# Patient Record
Sex: FEMALE | Race: WHITE
[De-identification: names, ages, dates, MRNs, and addresses within clinical notes are randomized per-mention and may not be internally consistent; named-entity substitution may affect disease eponyms.]

---

## 2018-07-25 ENCOUNTER — HOSPITAL ENCOUNTER (INPATIENT)
Dept: HOSPITAL 96 - M.ERS | Age: 68
LOS: 5 days | Discharge: NURSING, REHAB OR LONG-TERM CARE | DRG: 493 | End: 2018-07-30
Attending: INTERNAL MEDICINE | Admitting: INTERNAL MEDICINE
Payer: COMMERCIAL

## 2018-07-25 VITALS — SYSTOLIC BLOOD PRESSURE: 146 MMHG | DIASTOLIC BLOOD PRESSURE: 68 MMHG

## 2018-07-25 VITALS — BODY MASS INDEX: 17.74 KG/M2 | HEIGHT: 67.01 IN | WEIGHT: 113 LBS

## 2018-07-25 VITALS — SYSTOLIC BLOOD PRESSURE: 137 MMHG | DIASTOLIC BLOOD PRESSURE: 66 MMHG

## 2018-07-25 DIAGNOSIS — Z79.2: ICD-10-CM

## 2018-07-25 DIAGNOSIS — Z79.82: ICD-10-CM

## 2018-07-25 DIAGNOSIS — E87.1: ICD-10-CM

## 2018-07-25 DIAGNOSIS — E87.6: ICD-10-CM
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
## 2018-07-25 DIAGNOSIS — Z79.899: ICD-10-CM
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
## 2018-07-25 DIAGNOSIS — G62.9: ICD-10-CM

## 2018-07-25 DIAGNOSIS — I10: ICD-10-CM

## 2018-07-25 DIAGNOSIS — Z87.891: ICD-10-CM

## 2018-07-25 DIAGNOSIS — F10.10: ICD-10-CM

## 2018-07-25 DIAGNOSIS — E83.42: ICD-10-CM

## 2018-07-25 DIAGNOSIS — Z88.8: ICD-10-CM

## 2018-07-25 DIAGNOSIS — Z88.2: ICD-10-CM
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
## 2018-07-25 DIAGNOSIS — E78.00: ICD-10-CM
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
## 2018-07-25 DIAGNOSIS — S82.852A: Primary | ICD-10-CM

## 2018-07-25 LAB
ABSOLUTE MONOCYTES: 0.7 THOU/UL (ref 0–1.2)
ALBUMIN SERPL-MCNC: 3.9 G/DL (ref 3.4–5)
ALP SERPL-CCNC: 77 U/L (ref 46–116)
ALT SERPL-CCNC: 30 U/L (ref 30–65)
ANION GAP SERPL CALC-SCNC: 8 MMOL/L (ref 7–16)
APTT BLD: 28.9 SECONDS (ref 25–31.3)
AST SERPL-CCNC: 39 U/L (ref 15–37)
BILIRUB SERPL-MCNC: 1 MG/DL
BUN SERPL-MCNC: 4 MG/DL (ref 7–18)
CALCIUM SERPL-MCNC: 8.8 MG/DL (ref 8.5–10.1)
CHLORIDE SERPL-SCNC: 86 MMOL/L (ref 98–107)
CO2 SERPL-SCNC: 26 MMOL/L (ref 21–32)
CREAT SERPL-MCNC: 0.5 MG/DL (ref 0.6–1.3)
GLUCOSE SERPL-MCNC: 97 MG/DL (ref 70–99)
GRANULOCYTES NFR BLD MANUAL: 85 %
HCT VFR BLD CALC: 35.8 % (ref 37–47)
HGB BLD-MCNC: 12.6 GM/DL (ref 12–15)
INR PPP: 1.1
LYMPHOCYTES # BLD: 0.8 THOU/UL (ref 0.8–5.3)
LYMPHOCYTES NFR BLD AUTO: 8 %
MCH RBC QN AUTO: 34.1 PG (ref 26–34)
MCHC RBC AUTO-ENTMCNC: 35.1 G/DL (ref 28–37)
MCV RBC: 97.3 FL (ref 80–100)
MONOCYTES NFR BLD: 7 %
MPV: 6.3 FL. (ref 7.2–11.1)
NEUTROPHILS # BLD: 9 THOU/UL (ref 1.6–8.1)
NUCLEATED RBCS: 0 /100WBC
PLATELET # BLD EST: ADEQUATE 10*3/UL
PLATELET COUNT*: 163 THOU/UL (ref 150–400)
POTASSIUM SERPL-SCNC: 4.7 MMOL/L (ref 3.5–5.1)
PROT SERPL-MCNC: 7.2 G/DL (ref 6.4–8.2)
PROTHROMBIN TIME: 10.7 SECONDS (ref 9.2–11.5)
RBC # BLD AUTO: 3.68 MIL/UL (ref 4.2–5)
RDW-CV: 13.4 % (ref 10.5–14.5)
SODIUM SERPL-SCNC: 120 MMOL/L (ref 136–145)
WBC # BLD AUTO: 10.6 THOU/UL (ref 4–11)

## 2018-07-25 PROCEDURE — 0SSGXZZ REPOSITION LEFT ANKLE JOINT, EXTERNAL APPROACH: ICD-10-PCS | Performed by: NURSE PRACTITIONER

## 2018-07-25 PROCEDURE — 2W3MX1Z IMMOBILIZATION OF LEFT LOWER EXTREMITY USING SPLINT: ICD-10-PCS | Performed by: NURSE PRACTITIONER

## 2018-07-26 VITALS — SYSTOLIC BLOOD PRESSURE: 155 MMHG | DIASTOLIC BLOOD PRESSURE: 73 MMHG

## 2018-07-26 VITALS — DIASTOLIC BLOOD PRESSURE: 66 MMHG | SYSTOLIC BLOOD PRESSURE: 149 MMHG

## 2018-07-26 VITALS — DIASTOLIC BLOOD PRESSURE: 78 MMHG | SYSTOLIC BLOOD PRESSURE: 156 MMHG

## 2018-07-26 PROCEDURE — 0QSH35Z REPOSITION LEFT TIBIA WITH EXTERNAL FIXATION DEVICE, PERCUTANEOUS APPROACH: ICD-10-PCS | Performed by: ORTHOPAEDIC SURGERY

## 2018-07-26 NOTE — EKG
[place] [address]
Phone:  [phone]                     ELECTROCARDIOGRAM REPORT      
_______________________________________________________________________________
 
Name:       BUBBA MELCHOR           Room:           [address]    ADM IN  
[facility]#:  [id number]      Account #:      [id number]  
Admission:  18     Attend Phys:    Eliud Fraser MD 
Discharge:               Date of Birth:  [birth year]  
         Report #: [id number]
    [id number]
_______________________________________________________________________________
THIS REPORT FOR:  //name//                      
 
                         [facility] ED
                                       
Test Date:    2018               Test Time:    21:58:21
Pat Name:     BUBBA MELCHOR             Department:   
Patient ID:   [id number]            Room:         [place] [address]
Gender:       F                        Technician:   WA
:          1950               Requested By: Omaira Ray
Order Number: [id number] MD:   Fabiano Tian
                                 Measurements
Intervals                              Axis          
Rate:         72                       P:            60
ND:           196                      QRS:          -17
QRSD:         90                       T:            14
QT:           444                                    
QTc:          486                                    
                           Interpretive Statements
Sinus rhythm
Borderline left axis deviation
Borderline prolonged QT interval
Compared to ECG 2013 19:31:01
No significant changes
 
Electronically Signed On 2018 15:19:54 CDT by Fabiano Tian
[email or web address]
 
 
 
 
 
 
 
 
 
 
 
 
 
 
 
 
 
  <ELECTRONICALLY SIGNED>
                                           By: Fabiano Tian MD, [facility]     
  18     1519
D: 18 2158   _____________________________________
T: 18 2158   Fabiano Tian MD, [facility]       /EPI

## 2018-07-27 VITALS — SYSTOLIC BLOOD PRESSURE: 117 MMHG | DIASTOLIC BLOOD PRESSURE: 61 MMHG

## 2018-07-27 VITALS — SYSTOLIC BLOOD PRESSURE: 151 MMHG | DIASTOLIC BLOOD PRESSURE: 67 MMHG

## 2018-07-27 VITALS — SYSTOLIC BLOOD PRESSURE: 164 MMHG | DIASTOLIC BLOOD PRESSURE: 88 MMHG

## 2018-07-27 VITALS — DIASTOLIC BLOOD PRESSURE: 67 MMHG | SYSTOLIC BLOOD PRESSURE: 132 MMHG

## 2018-07-27 VITALS — DIASTOLIC BLOOD PRESSURE: 72 MMHG | SYSTOLIC BLOOD PRESSURE: 138 MMHG

## 2018-07-28 VITALS — DIASTOLIC BLOOD PRESSURE: 61 MMHG | SYSTOLIC BLOOD PRESSURE: 133 MMHG

## 2018-07-28 VITALS — DIASTOLIC BLOOD PRESSURE: 62 MMHG | SYSTOLIC BLOOD PRESSURE: 153 MMHG

## 2018-07-28 VITALS — SYSTOLIC BLOOD PRESSURE: 172 MMHG | DIASTOLIC BLOOD PRESSURE: 78 MMHG

## 2018-07-28 VITALS — DIASTOLIC BLOOD PRESSURE: 89 MMHG | SYSTOLIC BLOOD PRESSURE: 160 MMHG

## 2018-07-28 VITALS — SYSTOLIC BLOOD PRESSURE: 140 MMHG | DIASTOLIC BLOOD PRESSURE: 56 MMHG

## 2018-07-28 VITALS — SYSTOLIC BLOOD PRESSURE: 148 MMHG | DIASTOLIC BLOOD PRESSURE: 86 MMHG

## 2018-07-28 LAB
ANION GAP SERPL CALC-SCNC: 5 MMOL/L (ref 7–16)
BUN SERPL-MCNC: 2 MG/DL (ref 7–18)
CALCIUM SERPL-MCNC: 8.6 MG/DL (ref 8.5–10.1)
CHLORIDE SERPL-SCNC: 99 MMOL/L (ref 98–107)
CO2 SERPL-SCNC: 29 MMOL/L (ref 21–32)
CREAT SERPL-MCNC: 0.5 MG/DL (ref 0.6–1.3)
GLUCOSE SERPL-MCNC: 137 MG/DL (ref 70–99)
POTASSIUM SERPL-SCNC: 3.5 MMOL/L (ref 3.5–5.1)
SODIUM SERPL-SCNC: 133 MMOL/L (ref 136–145)

## 2018-07-29 VITALS — DIASTOLIC BLOOD PRESSURE: 71 MMHG | SYSTOLIC BLOOD PRESSURE: 143 MMHG

## 2018-07-29 VITALS — SYSTOLIC BLOOD PRESSURE: 161 MMHG | DIASTOLIC BLOOD PRESSURE: 77 MMHG

## 2018-07-29 VITALS — SYSTOLIC BLOOD PRESSURE: 163 MMHG | DIASTOLIC BLOOD PRESSURE: 69 MMHG

## 2018-07-29 VITALS — DIASTOLIC BLOOD PRESSURE: 74 MMHG | SYSTOLIC BLOOD PRESSURE: 144 MMHG

## 2018-07-29 VITALS — SYSTOLIC BLOOD PRESSURE: 149 MMHG | DIASTOLIC BLOOD PRESSURE: 81 MMHG

## 2018-07-29 LAB
ANION GAP SERPL CALC-SCNC: 5 MMOL/L (ref 7–16)
BUN SERPL-MCNC: 2 MG/DL (ref 7–18)
CALCIUM SERPL-MCNC: 8.5 MG/DL (ref 8.5–10.1)
CHLORIDE SERPL-SCNC: 97 MMOL/L (ref 98–107)
CO2 SERPL-SCNC: 28 MMOL/L (ref 21–32)
CREAT SERPL-MCNC: 0.4 MG/DL (ref 0.6–1.3)
GLUCOSE SERPL-MCNC: 107 MG/DL (ref 70–99)
HCT VFR BLD CALC: 27.5 % (ref 37–47)
HGB BLD-MCNC: 9.5 GM/DL (ref 12–15)
MAGNESIUM SERPL-MCNC: 1.3 MG/DL (ref 1.8–2.4)
MCH RBC QN AUTO: 34.7 PG (ref 26–34)
MCHC RBC AUTO-ENTMCNC: 34.6 G/DL (ref 28–37)
MCV RBC: 100.4 FL (ref 80–100)
MPV: 7.1 FL. (ref 7.2–11.1)
PLATELET COUNT*: 113 THOU/UL (ref 150–400)
POTASSIUM SERPL-SCNC: 3.8 MMOL/L (ref 3.5–5.1)
RBC # BLD AUTO: 2.74 MIL/UL (ref 4.2–5)
RDW-CV: 13.3 % (ref 10.5–14.5)
SODIUM SERPL-SCNC: 130 MMOL/L (ref 136–145)
WBC # BLD AUTO: 3.3 THOU/UL (ref 4–11)

## 2018-07-30 VITALS — SYSTOLIC BLOOD PRESSURE: 148 MMHG | DIASTOLIC BLOOD PRESSURE: 75 MMHG

## 2018-07-30 VITALS — DIASTOLIC BLOOD PRESSURE: 66 MMHG | SYSTOLIC BLOOD PRESSURE: 147 MMHG

## 2018-07-30 VITALS — DIASTOLIC BLOOD PRESSURE: 81 MMHG | SYSTOLIC BLOOD PRESSURE: 147 MMHG

## 2018-07-30 VITALS — SYSTOLIC BLOOD PRESSURE: 163 MMHG | DIASTOLIC BLOOD PRESSURE: 77 MMHG

## 2018-07-30 VITALS — DIASTOLIC BLOOD PRESSURE: 75 MMHG | SYSTOLIC BLOOD PRESSURE: 148 MMHG

## 2018-07-30 LAB
ANION GAP SERPL CALC-SCNC: 6 MMOL/L (ref 7–16)
BUN SERPL-MCNC: 4 MG/DL (ref 7–18)
CALCIUM SERPL-MCNC: 8.6 MG/DL (ref 8.5–10.1)
CHLORIDE SERPL-SCNC: 98 MMOL/L (ref 98–107)
CO2 SERPL-SCNC: 27 MMOL/L (ref 21–32)
CREAT SERPL-MCNC: 0.5 MG/DL (ref 0.6–1.3)
GLUCOSE SERPL-MCNC: 102 MG/DL (ref 70–99)
HCT VFR BLD CALC: 28.9 % (ref 37–47)
HGB BLD-MCNC: 10 GM/DL (ref 12–15)
MAGNESIUM SERPL-MCNC: 1.8 MG/DL (ref 1.8–2.4)
MCH RBC QN AUTO: 34.3 PG (ref 26–34)
MCHC RBC AUTO-ENTMCNC: 34.7 G/DL (ref 28–37)
MCV RBC: 98.8 FL (ref 80–100)
MPV: 6.8 FL. (ref 7.2–11.1)
PLATELET COUNT*: 141 THOU/UL (ref 150–400)
POTASSIUM SERPL-SCNC: 3.6 MMOL/L (ref 3.5–5.1)
RBC # BLD AUTO: 2.93 MIL/UL (ref 4.2–5)
RDW-CV: 13.4 % (ref 10.5–14.5)
SODIUM SERPL-SCNC: 131 MMOL/L (ref 136–145)
WBC # BLD AUTO: 3.8 THOU/UL (ref 4–11)

## 2018-08-14 ENCOUNTER — HOSPITAL ENCOUNTER (OUTPATIENT)
Dept: HOSPITAL 96 - M.PRE | Age: 68
Discharge: STILL ADMITTED OR ADMINISTRATIVE | End: 2018-08-14
Attending: INTERNAL MEDICINE
Payer: COMMERCIAL

## 2018-08-14 VITALS — DIASTOLIC BLOOD PRESSURE: 71 MMHG | SYSTOLIC BLOOD PRESSURE: 131 MMHG

## 2018-08-14 VITALS — WEIGHT: 110 LBS | BODY MASS INDEX: 18.33 KG/M2 | HEIGHT: 65 IN

## 2018-08-14 DIAGNOSIS — S82.62XA: Primary | ICD-10-CM
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
## 2018-08-14 LAB
ALBUMIN SERPL-MCNC: 3.4 G/DL (ref 3.4–5)
ALP SERPL-CCNC: 96 U/L (ref 46–116)
ALT SERPL-CCNC: 20 U/L (ref 30–65)
ANION GAP SERPL CALC-SCNC: 4 MMOL/L (ref 7–16)
AST SERPL-CCNC: 19 U/L (ref 15–37)
BILIRUB SERPL-MCNC: 0.9 MG/DL
BUN SERPL-MCNC: 6 MG/DL (ref 7–18)
CALCIUM SERPL-MCNC: 9.3 MG/DL (ref 8.5–10.1)
CHLORIDE SERPL-SCNC: 98 MMOL/L (ref 98–107)
CO2 SERPL-SCNC: 30 MMOL/L (ref 21–32)
CREAT SERPL-MCNC: 0.6 MG/DL (ref 0.6–1.3)
GLUCOSE SERPL-MCNC: 101 MG/DL (ref 70–99)
POTASSIUM SERPL-SCNC: 4.2 MMOL/L (ref 3.5–5.1)
PROT SERPL-MCNC: 7.6 G/DL (ref 6.4–8.2)
SODIUM SERPL-SCNC: 132 MMOL/L (ref 136–145)

## 2018-08-22 ENCOUNTER — HOSPITAL ENCOUNTER (INPATIENT)
Dept: HOSPITAL 96 - M.PRE | Age: 68
LOS: 2 days | Discharge: HOME | DRG: 504 | End: 2018-08-24
Attending: INTERNAL MEDICINE | Admitting: INTERNAL MEDICINE
Payer: COMMERCIAL

## 2018-08-22 VITALS — HEIGHT: 65 IN | BODY MASS INDEX: 18.33 KG/M2 | WEIGHT: 110 LBS

## 2018-08-22 VITALS — SYSTOLIC BLOOD PRESSURE: 186 MMHG | DIASTOLIC BLOOD PRESSURE: 82 MMHG

## 2018-08-22 VITALS — SYSTOLIC BLOOD PRESSURE: 130 MMHG | DIASTOLIC BLOOD PRESSURE: 60 MMHG
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
## 2018-08-22 DIAGNOSIS — Z79.899: ICD-10-CM

## 2018-08-22 DIAGNOSIS — G62.9: ICD-10-CM
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
## 2018-08-22 DIAGNOSIS — M86.8X7: ICD-10-CM

## 2018-08-22 DIAGNOSIS — S82.842A: Primary | ICD-10-CM

## 2018-08-22 DIAGNOSIS — I10: ICD-10-CM

## 2018-08-22 DIAGNOSIS — Z88.8: ICD-10-CM
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
## 2018-08-22 DIAGNOSIS — E78.00: ICD-10-CM

## 2018-08-22 DIAGNOSIS — E44.0: ICD-10-CM

## 2018-08-22 DIAGNOSIS — Z87.891: ICD-10-CM

## 2018-08-22 DIAGNOSIS — Z88.2: ICD-10-CM

## 2018-08-22 NOTE — NUR
PATIENT ARRIVED BY BED FROM PACU TO ROOM 113 IN STABLE CONDITION AT 1955.
ALERT AND ORIENTED X4. DENIES PAIN OR NAUSEA. DAUGHTER AT BEDSIDE. WILL
CONTINUE TO MONITOR.

## 2018-08-23 VITALS — SYSTOLIC BLOOD PRESSURE: 120 MMHG | DIASTOLIC BLOOD PRESSURE: 60 MMHG

## 2018-08-23 VITALS — DIASTOLIC BLOOD PRESSURE: 68 MMHG | SYSTOLIC BLOOD PRESSURE: 137 MMHG

## 2018-08-23 VITALS — DIASTOLIC BLOOD PRESSURE: 68 MMHG | SYSTOLIC BLOOD PRESSURE: 110 MMHG

## 2018-08-23 VITALS — DIASTOLIC BLOOD PRESSURE: 65 MMHG | SYSTOLIC BLOOD PRESSURE: 125 MMHG

## 2018-08-23 VITALS — DIASTOLIC BLOOD PRESSURE: 56 MMHG | SYSTOLIC BLOOD PRESSURE: 102 MMHG

## 2018-08-23 LAB
ALBUMIN SERPL-MCNC: 2.3 G/DL (ref 3.4–5)
ALP SERPL-CCNC: 77 U/L (ref 46–116)
ALT SERPL-CCNC: 9 U/L (ref 30–65)
ANION GAP SERPL CALC-SCNC: 4 MMOL/L (ref 7–16)
AST SERPL-CCNC: 11 U/L (ref 15–37)
BILIRUB SERPL-MCNC: 0.6 MG/DL
BUN SERPL-MCNC: 8 MG/DL (ref 7–18)
CALCIUM SERPL-MCNC: 7.4 MG/DL (ref 8.5–10.1)
CHLORIDE SERPL-SCNC: 98 MMOL/L (ref 98–107)
CO2 SERPL-SCNC: 28 MMOL/L (ref 21–32)
CREAT SERPL-MCNC: 0.7 MG/DL (ref 0.6–1.3)
ESR (SEDRATE): 74 MM/HR (ref 0–30)
GLUCOSE SERPL-MCNC: 113 MG/DL (ref 70–99)
HCT VFR BLD CALC: 28.5 % (ref 37–47)
HGB BLD-MCNC: 9.7 GM/DL (ref 12–15)
MCH RBC QN AUTO: 32.6 PG (ref 26–34)
MCHC RBC AUTO-ENTMCNC: 34 G/DL (ref 28–37)
MCV RBC: 95.8 FL (ref 80–100)
MPV: 7.7 FL. (ref 7.2–11.1)
PLATELET COUNT*: 178 THOU/UL (ref 150–400)
POTASSIUM SERPL-SCNC: 4.3 MMOL/L (ref 3.5–5.1)
PROT SERPL-MCNC: 5.1 G/DL (ref 6.4–8.2)
RBC # BLD AUTO: 2.97 MIL/UL (ref 4.2–5)
RDW-CV: 12.8 % (ref 10.5–14.5)
SODIUM SERPL-SCNC: 130 MMOL/L (ref 136–145)
WBC # BLD AUTO: 5.8 THOU/UL (ref 4–11)

## 2018-08-23 PROCEDURE — 2W3RX2Z IMMOBILIZATION OF LEFT LOWER LEG USING CAST: ICD-10-PCS

## 2018-08-23 PROCEDURE — 02HV33Z INSERTION OF INFUSION DEVICE INTO SUPERIOR VENA CAVA, PERCUTANEOUS APPROACH: ICD-10-PCS

## 2018-08-23 PROCEDURE — 0SPGX5Z REMOVAL OF EXTERNAL FIXATION DEVICE FROM LEFT ANKLE JOINT, EXTERNAL APPROACH: ICD-10-PCS | Performed by: INTERNAL MEDICINE

## 2018-08-23 PROCEDURE — 0QBM0ZZ EXCISION OF LEFT TARSAL, OPEN APPROACH: ICD-10-PCS | Performed by: ORTHOPAEDIC SURGERY

## 2018-08-23 NOTE — NUR
PT.SITTING ON SIDE OF BED. ALERT AND ORIENTED. SHE SAID SHE FEELS GOOD. IS
DOING WELL IN THERAPY. SHE HAS A FRONT WHEEL WALKER. SHE HAD BEEN OVER AT
[facility] SINCE THE END OF JULY AFTER SHE FELL AT HOME AND HAD AN
EXTERNAL FIXATOR ON HER ANKLE. SHE SAID SHE IS GLAD TO HAVE THAT THING OFF HER
ANKLE. SHE IS PLANNING ON GOING HOME AT [facility]. SHE IS AWARE CULTURES ARE
PENDING FROM HER ANKLE. THEY WILL DETERMINE IF SHE WILL NEED IVABs AT HOME.
SHE FEELS SHE CAN LEARN TO INFUSE HER OWN ANTIBIOTICS. HER  HAS HOME
HEALTH FROM [facility]. IF NEEDED SHE WOULD WANT TO USE
THEM ALSO.  HER DAUGHTER EPI IS SUPPORTIVE. CM WILL FOLLOW.

## 2018-08-23 NOTE — NUR
PATIENT REMAINS ALERT AND ORIENTED X4. UP WITH ASSIST X1 GAIT BELT AND WALKER.
VOIDING ADQUATELY PER [facility]. TOLERATING DIET. MEDICATED FOR PAIN X1 TO GOOD
EFFECT. IV ABX INFUSED AS ORDERED. VITALS STABLE ON ROOM AIR. PATIENT REUSED
CAPNO OVERNIGHT. CONTINUE TO MONITOR.

## 2018-08-23 NOTE — NUR
PATIENT REMAINED ALERT AND ORIENTED X'4. VITAL SIGNS AND SPO2 STABLE. IV
CLEAN, FLUIDS INFUSING. PAIN WELL CONTROLLED WITH PAIN MEDS. TOLERATED DIET,
NO NAUSEA AND VOMITING. COMPLETED PT/OT. COMPLETED HOURLY ROUNDING. CALL LIGHT
WITHIN REACH. WILL CONTINUE TO MONITOR.

## 2018-08-23 NOTE — NUR
RIGHT BASILIC VESSEL ACCESSED FOR SINGLE LUMEN 4 Uzbek PICC LINE.  LINE
PRE-TRIMMED TO 38CM AND ADVANCED TO THE ZERO WING WITH NO RESISTANCE MET.
UPPER ARM CIRCUMFERENCE ABOVE INSERTION SITE=9 1/2".  GOOD BRISK BLOOD RETURN
NOTED, LINE FLUSHES FREELY.  SHERLOCK AND 3CG CONFIRMATION OF TIP TERMINATION
AT THE CAVOATRIAL JUNCTION.  STYLET REMOVED LINE DRESSED, REPORT GIVEN TO
NED BLANDON.

## 2018-08-23 NOTE — NUR
Nutrition: Pt seen for low BMI 18.3. Pt has always been thin and borderline
underweight. She stated she has lost ~5# since July, when she start PT at [facility].
She stated her appetite is good. She does like vanilla Ensure that she had at
the [facility] ordered some for her. We discussed importance of protein for
muscle mass and strength. Regular diet ordered. Current wt: 110#. Consider
Mild risk. GOALS: no further loss from 110#, continued good po intake.

## 2018-08-24 VITALS — DIASTOLIC BLOOD PRESSURE: 73 MMHG | SYSTOLIC BLOOD PRESSURE: 137 MMHG

## 2018-08-24 VITALS — DIASTOLIC BLOOD PRESSURE: 66 MMHG | SYSTOLIC BLOOD PRESSURE: 147 MMHG

## 2018-08-24 VITALS — DIASTOLIC BLOOD PRESSURE: 63 MMHG | SYSTOLIC BLOOD PRESSURE: 119 MMHG

## 2018-08-24 VITALS — SYSTOLIC BLOOD PRESSURE: 137 MMHG | DIASTOLIC BLOOD PRESSURE: 73 MMHG

## 2018-08-24 NOTE — NUR
ORDER RECEIVED FROM [facility] TO DISPENSE RW, APPROVAL RECEIVED FROM [facility]
VIA [facility]. P.T. ALEKSAL COMPLETED 8/23/18 AND PT REQUIRED RW TO MAINTAIN POST-OP
NWB LLE. RW ISSUED, ADJUSTED TO APPROPRIATE HEIGHT, PAPERWORK SIGNED, COPIED,
AND PLACED IN [facility] BOX.

## 2018-08-24 NOTE — NUR
PATIENT LEFT UNIT AT 1800 BY WHEELCHAIR WITH NURSING STAFF. IV DC'D. PICC LINE
FLUSHED. EDUCATED PATIENT ON NEW MED SCRIPTS AND DISCHARGE INSTRUCTIONS.
PATIENT VERBALIZED UNDERSTANDING. ALL BELONGINGS LEFT WITH PATIENT

## 2018-08-24 NOTE — NUR
PATIENT REMAINS ALERT AND ORIENTED X4. UP WITH ASSIST X1 TO BATHROOM.
ANTIBIOTICS INFUSED AS ORDERED. PAIN CONTROLLED WITH ORAL MEDICATIONS.
TOLERATING DIET. CAST IN PLACE TO LEFT LEG, ELEVATED ON PILLOWS. VITALS STABLE
ON ROOM AIR. CONTINUE TO MONITOR.

## 2018-08-24 NOTE — NUR
WROTE FOR IV ROCEPHIN DAILY AT HOME. DISCUSSED WITH PT. SHE WANTS TO
DO AT HOME RATHER THAN COME IN DAILY FOR INFUSION. SHE CHOSE [facility] AND [facility] .
FAXED REFERRAL AND ORDERS TO [facility] AND TO CHECK BENEFITS. FAXED REFERRAL TO
[facility] ALONG WITH H&P,OP REPORT AND ORDERS. NEXT DOSE DUE AT 1400
TOMORROW. NURSE GAVE FIRST DOSE TODAY AT 1400. PT.NEEDS FRONT WHEEL WALKER. OK
FROM [facility]. FAXED ORDER TO HER AND GAVE ORDER TO [facility].
PT.HAD NUMBER FOR [facility] [facility]. WANTED ANOTHER immoture.be. TO HAVE
FOR PRICE COMPARISON. GAVE HER RED LETTER TRANSPORTATION NUMBER.

## 2018-08-24 NOTE — CON
[address]  [address]                    CONSULTATION                  
_______________________________________________________________________________
 
Name:       BUBBA MELCHOR           Room:           [address] [place]  
.R.#:  [id number]      Account #:      [id number]  
Admission:  08/22/18     Attend Phys:    CHAKA Heath
Discharge:               Date of Birth:  [birth year]  
         Report #: [id number]
                                                                     [id number]  
_______________________________________________________________________________
THIS REPORT FOR:  //name//                      
 
CC: Eliud Restrepo
 
DATE OF SERVICE:  08/23/2018
 
 
INFECTIOUS DISEASE CONSULTATION
 
ATTENDING PHYSICIAN:  Cain Restrepo DO.
 
REASON FOR EVALUATION:  Suspected distal left lower extremity osteomyelitis in
the setting of previous trimalleolar ankle fracture with stabilization utilizing
an external fixator.
 
HISTORY OF PRESENT ILLNESS:  Chart reviewed, patient examined.  This is a
67-year-old initially admitted through the Emergency Room on 07/25/2018 with
left ankle pain, had sustained an injury.  This was evaluated, confirmed to have
a trimalleolar fracture, underwent operative procedure with closed reduction and
application of external fixator to the left ankle, ultimately discharged on
07/30/2018.  During the hospitalization, it was confirmed to have hyponatremia,
hypertension, neuropathy.  This was on setting of ethanol excess.  She was
readmitted due to concern about possible pin site infection around the
malleolus.  She kind of minimizes things.  She denies any fevers, did not really
complain of any pain per se.  Apparently clinically, was felt to have increasing
inflammation and underwent operative removal.  At this point, she has a cast on.
 Denies any significant pulmonary or gastrointestinal-related complaints.  She
was empirically placed on piperacillin, tazobactam as well as vancomycin.
 
ALLERGIES:  SULFA AND LOSARTAN.
 
CURRENT MEDICATIONS:  Include enoxaparin, thiamine, multivitamin, metoprolol,
aspirin, Zosyn, vancomycin, hydrocodone.
 
PAST MEDICAL HISTORY:  As noted above, hypertension, high cholesterol,
peripheral neuropathy.
 
SOCIAL HISTORY:  Former smoker, daily ethanol.
 
FAMILY HISTORY:  Noncontributory.
 
REVIEW OF SYSTEMS:  As above.
 
PHYSICAL EXAMINATION:
 
 
 
[place] [address]                    CONSULTATION                  
_______________________________________________________________________________
 
Name:       BUBBA MELCHOR           Room:           [address] [place]  
[facility]#:  [id number]      Account #:      [id number]  
Admission:  08/22/18     Attend Phys:    CHAKA Heath
Discharge:               Date of Birth:  [birth year]  
         Report #: [id number]
                                                                     [id number]  
_______________________________________________________________________________
GENERAL:  She is alert, cooperative.  She does appear somewhat chronically ill,
undernourished.
VITAL SIGNS:  Temperature 98.9, pulse 72, respirations 20, blood pressure
102/56.
SKIN:  Warm, dry, no rashes.
HEENT:  Otherwise, unremarkable.
NECK:  Supple.
LUNGS:  Diminished breath sounds.  Few scattered crackles at the bases.
HEART:  Does have a soft systolic murmur.
ABDOMEN:  Soft, nontender, nondistended.
EXTREMITIES:  Left lower extremity has a cast to below the knee.
GENITOURINARY:  Deferred.
RECTAL:  Deferred.
 
LABORATORY DATA:  Operative cultures are pending.  CBC:  White count of 5.8, H
and H 9.7 and 28.5, platelets 178.  Sed rate of 74.  Electrolytes:  Sodium 130,
potassium 4.3, chloride 98, bicarbonate is 28, anion gap of 4, BUN and
creatinine 8 and 0.7, glucose of 113.  LFTs unremarkable.  Albumin 2.3, total
protein of 5.1, estimated GFR of 83.  Two-view of the left ankle postop showed
no residual radiopaque foreign body.
 
ASSESSMENT:  Suspected deep infection involving the left ankle in the setting of
fracture and external fixator.  We will continue empiric antimicrobial therapy. 
Await cultures.  We will discuss with the surgeon.  Inclination is likely to go
ahead and place a PICC line and treat with parenteral therapy at least so it
becomes apparent which direction it is going.  I do not have access to the wound
at this point.
 
 
 
 
 
 
 
 
 
 
 
 
 
 
 
 
 
<ELECTRONICALLY SIGNED>
                                        By:  Fredy Beauchamp MD           
08/24/18     0754
D: 08/23/18 1614_______________________________________
T: 08/23/18 2025Jodenisse Beauchamp MD              /nt

## 2018-09-04 NOTE — OP
[facility] 
[address] [place]  [address]                    OPERATIVE REPORT              
_______________________________________________________________________________
 
Name:       BUBBA MELCHOR           Room:           [address] [place]  
.R#:  [id number]      Account #:      [id number]  
Admission:  08/22/18     Attend Phys:    CHAKA Heath
Discharge:  08/24/18     Date of Birth:  [birth year]  
         Report #: [id number]
                                                                     [id number]  
_______________________________________________________________________________
THIS REPORT FOR:  //name//                      
 
CC: Eliud Restrepo
 
PREOPERATIVE DIAGNOSIS:  Left bimalleolar ankle fracture, unstable, placed in
external fixation.
 
POSTOPERATIVE DIAGNOSES:
1.  Left bimalleolar ankle fracture, unstable, placed in external fixation.
2.  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
 
PROCEDURE:  Removal of external fixation under anesthesia, irrigation and
debridement including the calcaneus with sequestrectomy and removal of necrotic
and infected bone.  Examination under anesthesia of the ankle with application
of short leg cast.
 
SURGEON:  Eliud Madden DO.
 
ASSISTANT:
1.  Fabio Duncan DO.
2.  Kerwin Fairbanks DO
 
ANESTHESIA:  General.
 
ANTIBIOTICS:  Weight appropriate dosing Ancef.
 
INTRAVENOUS FLUID:  Lactated Ringer's 755 mL.
 
ESTIMATED BLOOD LOSS:  50 mL.
 
DRAINS:  None.
 
COMPLICATIONS:  None.
 
SPECIMENS:  Multiple specimens were taken.  We took cultures of the calcaneal
pin.  We took cultures of the lateral wound before prepping the leg.  We took
cultures from the medial and lateral wounds intraoperatively.  We also sent
debrided tissue and also calcaneal bone.
 
CONDITION OF PATIENT:  Stable to PACU.
 
IMPLANTS:  None.
 
INDICATIONS FOR PROCEDURE:  The patient presented to [address]  [address]                    OPERATIVE REPORT              
_______________________________________________________________________________
 
Name:       BUBBA MELCHOR           Room:           [address]#:  [id number]      Account #:      [id number]  
Admission:  08/22/18     Attend Phys:    CHAKA Heath
Discharge:  08/24/18     Date of Birth:  [birth year]  
         Report #: [id number]
                                                                     [id number]  
_______________________________________________________________________________
for operative treatment of her left ankle fracture.  I had a long conversation
with her and her daughter about the plan for surgery.  Please see preoperative
note and consult for full list of what was discussed.  We obtained a verbal and
written consent to proceed.
 
DESCRIPTION OF PROCEDURE:  I marked the left lower extremity in the presence of
the operative team members.  Everyone agreed this was correct.  She was then
transferred to the operative suite where a briefing was performed indicating
correct patient, procedure, site, antibiotics and implants were present and
sterile.  All team members agreed.  She was transferred over to the operative
table in the supine position.  She was well padded and secured.  General
anesthetic was administered.  We then began by examining the left lower
extremity.  We removed the external fixation.  Upon going to remove the
calcaneal pin, it became obvious that there was significant fluctuation within
the pin site.  It was very loose.  Upon movement of the pin .  Purulent
material began to be running from the lateral pin site.  We cut the calcaneal
pin site medially and then pulled this through.  It was very easily mobile and
purulent material followed with it.  We brought in C-arm and showed that there
had been tracking of the calcaneus pin site and it had widened quite
significantly compared to the initial x-ray.  there was some necrotic skin
along the lateral edge as well.  At this point in time, I then left the
operative suite to talk to the daughter, described to her our findings.  If
there was some purulence along that tract site, my recommendation in the
presence of active infection, I would recommend abandoning plans for internal
fixation.  We need to aggressively debride this wound, get to clean healthy
edges, remove any infected bone, irrigate, sent multiple specimens, get
Infectious Disease involved and likely, this will be managed nonoperatively.  I
did discuss with the daughter that this could be quite a significant problem,
could even be limb threatening and leads to amputation if we cannot get the
wounds to heal or control infection.  She understood this, agreed with the plan
to proceed forward.  At that point in time, I returned to the operative suite
where we sterilely prepped and draped the leg in standard fashion.  We
performed a timeout and we confirmed our procedure.  At this point in time, we
now changed to performing an irrigation and debridement of the calcaneal pin
sites including the calcaneus and the plan to do an examination under
anesthesia of the ankle, check its stability and if stable, place her in a
well-padded short leg cast.  All team members agreed.  At this point in time,
we obtained culture sites intraoperatively of the wounds, sent this off.  We
began debriding the soft tissue that was appeared to be infected.  We then sent
in specimen tubing as well and passed off, labeled appropriately.  At that
point in time, we were then down under the calcaneal sites, both the medial and
lateral wounds.  We used a curette to curette any infected type bone and get
down to what appeared to be stable cancellous bone with no sign of infection
and was not removed easily.  At that point in time after performing the full
excision and then sequestrectomy, we then began thoroughly irrigating with 6
liters normal saline the medial and lateral wounds.  After doing this, we then
 
 
 
[address]  [address]                    OPERATIVE REPORT              
_______________________________________________________________________________
 
Name:       BUBBA MELCHOR           Room:           [address] [place]  
[facility]#:  [id number]      Account #:      [id number]  
Admission:  08/22/18     Attend Phys:    CHAKA Heath
Discharge:  08/24/18     Date of Birth:  [birth year]  
         Report #: [id number]
                                                                     [id number]  
_______________________________________________________________________________
brought in C-arm, we confirmed that we had widened out the calcaneal pin site
as expected.  After removing the infected bone, we checked the ankle under
fluoroscopy.  Bimalleolar ankle fracture with the tibiotalar joint overall
maintained just significant shortening of the fibula and a little bit of
tibiotalar shift, but overall, this is acceptable and does not need any
external fixation nor would that be appropriate in the setting of this
infection.  At that point in time, we then turned our attention to the proximal
pin sites.  Using a separate instruments, we were able to curette these down
and irrigate them out as well.  We placed mupirocin ointment in those pin sites
and mupirocin ointment in the medial and lateral wounds.  We placed Adaptic
over this, sterile 4 x 4s and placed in a very well-padded short leg cast. 
After the short leg cast was applied and set, we then brought in C-arm
confirming that we still had good reduction of the tibiotalar joint with no
instability and overall good padding throughout the leg.  At that point in
time, she was extubated successfully and transferred off the operative table
and taken to PACU in stable condition.
 
POSTOPERATIVE COURSE AND EVALUATION:  I went to speak to her daughter, let her
know our findings that we did proceed forward with a successful I and D.  I
addressed any questions that she had.  She was very thankful for my time
and my explanations.  She stated that the last time she had an ankle injury, her
mom went on to have a significant instability of that ankle.  She actually has a
currently dislocated ankle that has healed in that position that she has been
walking on and she felt like she never got good explanation.  She was very
thankful for my time and detailed explanations for her mother's condition.  Her
mom was resting in PACU, doing well.  I answered any questions.  She had
informed her that we found the infection and perform the I and D and she was in
a cast.  She was thankful that the external fixation removed though she stated
that was the only thing she wanted to happen.  She was not upset that we were
not able to perform any fixation.  We will get Infectious Disease consulted to
follow these cultures, nonweightbearing and we will need to be seeing her weekly
for cast changes and wound checks.  Most likely, she will require wound care
throughout this process as well.  She is admitted to the hospitalist service
where they will order DVT prophylaxis, have already discussed this with them.
Pharmacologic is okay by us.  Pain control, we will try to get oral medications
as soon as possible.
 
 
 
 
 
 
 
 
<ELECTRONICALLY SIGNED>
                                        By:  Eliud Madden DO             
09/04/18     1120
D: 08/23/18 1823_______________________________________
T: 08/23/18 1907Eliud Madden DO                /nt

## 2018-09-19 NOTE — OP
[address]  [address]                    OPERATIVE REPORT              
_______________________________________________________________________________
 
Name:       BUBBA MELCHOR           Room:           M.309-P    DIS IN  
M.R.#:  [id number]      Account #:      [id number]  
Admission:  07/25/18     Attend Phys:    Eliud Fraser MD 
Discharge:  07/30/18     Date of Birth:  [birth year]  
         Report #: [id number]
                                                                     [id number]  
_______________________________________________________________________________
THIS REPORT FOR:  //name//                      
 
CC: Eliud Darling
 
DICTATED BY: Kerwin Fairbanks DO
 
DATE OF SERVICE:  07/26/2018
 
 
PREOPERATIVE DIAGNOSIS:  Closed left ankle trimalleolar fracture.
 
POSTOPERATIVE DIAGNOSIS:  Closed left ankle trimalleolar fracture.
 
PROCEDURE PERFORMED:  Closed reduction and application of external fixator to
the left ankle.
 
PERFORMED BY:  Sage Blancas DO
 
ASSISTANT:  Kerwin Fairbanks DO; Fabio Duncan DO; Dameon Caldwell DO
 
ESTIMATED BLOOD LOSS:  Less than 10 mL.
 
COMPLICATIONS:  None.
 
SPECIMENS:  None.
 
DRAINS:  No drains.
 
TOURNIQUET:  No tourniquet.
 
FINDINGS:  Left ankle trimalleolar fracture.
 
CONDITION:  Stable to PACU.
 
INDICATIONS FOR PROCEDURE:  The patient is a pleasant 67-year-old female who
unfortunately had left ankle pain.  She twisted her ankle getting out of bed
yesterday and unable to bear weight immediately thereafter.  She has a history
of alcohol use, hypertension with standing, peripheral neuropathy, several
closed reduction attempts were performed in the Emergency Room prior to her
admission with application of a splint.  They were unable to reduce this
secondary to the dislocated nature of the ankle mortise.  It was discussed with
the patient that we recommend surgical fixation and discussed with her the
alternatives, benefits, and risks including but not limited to damaged
neurovascular structures, postoperative infection, postoperative DVT, pulmonary
embolism, failure to resolve pain, possible need for repeat surgery in the
 
 
 
[place] [address]                    OPERATIVE REPORT              
_______________________________________________________________________________
 
Name:       BUBBA MELCHOR           Room:           [address] [place]  
[facility].#:  [id number]      Account #:      [id number]  
Admission:  07/25/18     Attend Phys:    Eliud Fraser MD 
Discharge:  07/30/18     Date of Birth:  [birth year]  
         Report #: [id number]
                                                                     [id number]  
_______________________________________________________________________________
future, any other adverse events secondary to be put under general anesthesia. 
The patient expressed understanding and wished to proceed.
 
DESCRIPTION OF PROCEDURE:  The patient was met in the preoperative bay where
correct site was marked, operation to be performed was confirmed, all of the
questions were addressed.  The patient signed consent.  She was taken back to
the operative suite, placed supine on a well-padded table.  She was given the
benefit of general anesthetic.  Bump and bone foam were placed in the left
aspect of the body and into the left leg.  Preoperative evaluation after removal
of the splint yielded significant fracture blistering medially and laterally
about the ankle as well as skin tenting on the medial aspect.  No gross skin
compromise or perforation was noted.  It was then at that point the decision was
made to proceed with external fixation to allow the soft tissues chance to calm
down and swelling to resolve as well as her blisters to resolve as well. 
Timeout was performed, correct procedure site, the patient was confirmed by all
those in the room and participating.  Under radiographic guidance the calcaneal
pin was inserted medial to lateral.  The skin was incised after the location was
identified on radiograph, then blunt dissection with needle nose was taken down
to bone.  The pin was taken through the calcaneus, through the tissue on the
contralateral on the lateral side after which the proximal tibia incision was
made in the skin.  Blunt dissection was taken down to the tibia right off the
crest on the medial aspect.  A to P was inserted under radiographic guidance,
deemed to be through both cortices.  A second tibial pin was inserted in the
same capacity.  A frame was then applied in a tilted fashion.  After
preemptively establishing our reduction, we had radiographs taken to reduce. 
Reduction was deemed to be appropriate, the length, axis and rotation.  Mortise
was well reduced.  At that time, it was decided to tighten the frame in the
respective position.  Final pictures were taken.  All sponge and needle counts
were correct x 2.  Xeroform, 4 x 4s, Kerlix, soft roll and a compressive Ace
wrap was applied to the lower extremity.  Pins were capped with rubber tips. 
After that, the patient was awoken and taken to the PACU in stable condition.
 
She will return to clinic in 1-2 weeks' time for reevaluation and discussion of
the second part of the staged procedure and timing for definitive fixation will
be discussed at that time.
 
 
 
 
 
 
 
 
 
<ELECTRONICALLY SIGNED>
                                        By:  Barrie Allen DO              
09/19/18     0809
D: 07/26/18 1359_______________________________________
T: 07/26/18 1430Sage Blancas DO          /nt

## 2018-09-19 NOTE — CON
[address]  [address]                    CONSULTATION                  
_______________________________________________________________________________
 
Name:       JILLBUBBAMERON TAPIA           Room:           [address] [place]  
M.R.#:  [id number]      Account #:      [id number]  
Admission:  08/22/18     Attend Phys:    CHAKA Heath
Discharge:  08/24/18     Date of Birth:  [birth year]  
         Report #: [id number]
                                                                     [id number]  
_______________________________________________________________________________
THIS REPORT FOR:  //name//                      
 
CC: Eliud Restrepo
 
Infectious Disease Consultation
 
The patient was seen with Dr. Eliud Madden.
 
HISTORY OF PRESENT ILLNESS:  The patient returns today in followup, having
completed roughly 4 weeks of parenteral therapy with ceftriaxone for
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
had had a complex tib-fib fracture that required external fixators.  These were
felt to be secondarily infected.  These were removed roughly 4 weeks ago.  She
has generally been doing well.  Denies significant amount of pain associated
with the site.
 
PHYSICAL EXAMINATION:
EXTREMITIES:  On examination, the cast was removed appears to have minimal
inflammation.  At this point, there is very little swelling.  There are still
some superficial ulcers noted at the medial lateral aspect of the calcaneus.
 
ASSESSMENT:  Osteomyelitis.  We will continue ceftriaxone as prescribed, weekly
labs.  I did review her labs from 09/10.  Sed rate was less than 10.  She did
have leukopenia.  We will have to monitor expectantly.  Labs will be available
in next 24 hours.  We will see her in 2 weeks.
 
 
 
 
 
 
 
 
 
 
 
 
 
 
 
 
 
<ELECTRONICALLY SIGNED>
                                        By:  Fredy Beauchamp MD           
09/19/18     1149
D: 09/18/18 1721_______________________________________
T: 09/18/18 2341Josebrad Beauchamp MD              /nt

## 2018-10-03 NOTE — CON
[address]  [address]                    CONSULTATION                  
_______________________________________________________________________________
 
Name:       JILLBUBBAMERON TAPIA           Room:           [address] [place]  
M.R.#:  [id number]      Account #:      [id number]  
Admission:  08/22/18     Attend Phys:    CHAKA Heath
Discharge:  08/24/18     Date of Birth:  [birth year]  
         Report #: [id number]
                                                                     [id number]  
_______________________________________________________________________________
THIS REPORT FOR:  //name//                      
 
CC: Eliud Restrepo
 
Seen in the patient's area with Dr. Madden, Orthopedic Surgery.
 
HISTORY OF PRESENT ILLNESS:  She returns today with ongoing issues of suspected
deep infection involving her left heel.  She did complete roughly 6 weeks of
parenteral therapy.  Generally, has been feeling fairly well.  Denies pain at
this point.  Continues to offload the site utilizing a footwear.  She has not
had any significant systemic illness including fevers or chills.  Appetite has
been reasonable.  Weight has been fairly stable.
 
ASSESSMENT AND PLAN:  Deep seated infection at site of external fixator pin in
the calcaneus.  At this point, we would discontinue the antibiotics.  Did speak
with Dr. Madden who thinks it was reasonable to go ahead and remove the PICC line.
 She was instructed to call if problems or concerns.  We will see as needed.
 
 
 
 
 
 
 
 
 
 
 
 
 
 
 
 
 
 
 
 
 
 
 
 
 
<ELECTRONICALLY SIGNED>
                                        By:  Fredy Beauchamp MD           
10/03/18     1538
D: 10/03/18 0852_______________________________________
T: 10/03/18 1534Josebrad Beauchamp MD              /nt

## 2019-01-02 ENCOUNTER — HOSPITAL ENCOUNTER (OUTPATIENT)
Dept: HOSPITAL 96 - M.RAD | Age: 69
End: 2019-01-02
Attending: NURSE PRACTITIONER
Payer: COMMERCIAL

## 2019-01-02 DIAGNOSIS — M85.89: ICD-10-CM

## 2019-01-02 DIAGNOSIS — Z78.0: ICD-10-CM

## 2019-01-02 DIAGNOSIS — M81.0: Primary | ICD-10-CM
